# Patient Record
Sex: MALE | Race: WHITE
[De-identification: names, ages, dates, MRNs, and addresses within clinical notes are randomized per-mention and may not be internally consistent; named-entity substitution may affect disease eponyms.]

---

## 2017-10-26 ENCOUNTER — HOSPITAL ENCOUNTER (OUTPATIENT)
Dept: HOSPITAL 61 - PCVCIMAG | Age: 67
Discharge: HOME | End: 2017-10-26
Attending: INTERNAL MEDICINE
Payer: MEDICARE

## 2017-10-26 DIAGNOSIS — F17.200: ICD-10-CM

## 2017-10-26 DIAGNOSIS — R06.00: ICD-10-CM

## 2017-10-26 DIAGNOSIS — R07.89: ICD-10-CM

## 2017-10-26 DIAGNOSIS — R53.83: ICD-10-CM

## 2017-10-26 DIAGNOSIS — I10: Primary | ICD-10-CM

## 2017-10-26 PROCEDURE — 78452 HT MUSCLE IMAGE SPECT MULT: CPT

## 2017-10-26 PROCEDURE — A9500 TC99M SESTAMIBI: HCPCS

## 2017-10-26 PROCEDURE — 93306 TTE W/DOPPLER COMPLETE: CPT

## 2017-10-26 PROCEDURE — 93017 CV STRESS TEST TRACING ONLY: CPT

## 2017-10-26 NOTE — PCVCIMAG
--------------- APPROVED REPORT --------------





Study performed:  10/26/2017 08:57:02



EXAM: Comprehensive 2D, Doppler, and color-flow 

Echocardiogram

Patient Location: Echo lab

Room #:  2Status:  routine



BSA:         2.21

HR: 60 bpmBP:          142/80 mmHg

Rhythm: NSR



Other Information 

Study Quality: Adequate



Risk Factors: 

Cardiac Risk Factors:  HTN, Smoking



Indications

Chest Pressure

Dyspnea 

Fatigue 

Hypertension/HDD



2D Dimensions

LVEF(%):  69.60 (&gt;50%)

IVSd:  7.03 (7-11mm)LVOT Diam:  21.61 (18-24mm) 

LVDd:  47.64 mm

PWd:  11.30 (7-11mm)Ascending Ao:  41.96 (22-36mm)

LVDs:  28.96 (25-40mm)

Left Atrium:  30.29 (27-40mm)

Aortic Root:  35.53 mm

LV Single Plane 4CH:  56.79 %

LV Single Plane 2CH:  58.47 %Parry's LVEF:  57.63 %

Biplane EF:  56.6 %



Volumes

Left Atrial Volume (Systole)

Single Plane 4CH:  78.14 mLSingle Plane 2CH:  83.58 mL

Biplane LA Volume:  81.00 mLLA ESV Index:  37.00 mL/m2



Aortic Valve

AoV Peak Abhay.:  1.44 m/s

AO Peak Gr.:  8.29 mmHgLVOT Max PG:  3.21 mmHg

LVOT Max V:  0.89 m/s

RONAL Vmax: 2.26 cm2



Mitral Valve

E/A Ratio:  1.0

MV Decel. Time:  219.64 ms

MV E Max Abhay.:  0.68 m/s

MV A Abhay.:  0.71 m/s

IVRT:  152.25 ms



TDI

E/Lateral E':  11.33E/Medial E':  9.71

Medial E' Abhay.:  0.07 m/s

Lateral E' Abhay.:  0.06 m/s



Pulmonary Valve

PV Peak Abhay.:  0.91 m/sPV Peak Gr.:  3.33 mmHg



Pulmonary Vein

P Vein S:    0.53 m/sP Vein A:  0.35 m/s

P Vein D:   0.39 m/sP Vein A Dur.:  90.0 msec

P Vein S/D Ratio:  1.36



Tricuspid Valve

TR Peak Abhay.:  2.43 m/s

TR Peak Gr.:  23.65 mmHg

TV Vmax:  0.48 m/sPA Pressure:  31.00 mmHg



Left Ventricle

The left ventricle is normal size. There is normal LV segmental wall 

motion. There is normal left ventricular wall thickness. Left 

ventricular systolic function is normal. The left ventricular 

ejection fraction is within the normal range. LVEF is 55-60%. The 

left ventricular diastolic function is normal.



Right Ventricle

The right ventricle is normal size. The right ventricular systolic 

function is normal.



Atria

Left atrium is mildly dilated. The right atrium size is 

normal.



Aortic Valve

The aortic valve is normal in structure. No aortic regurgitation is 

present. There is no aortic valvular stenosis.



Mitral Valve

The mitral valve is normal in structure. There is no mitral valve 

regurgitation noted. No evidence of mitral valve stenosis.



Tricuspid Valve

The tricuspid valve is normal in structure. Trace to mild tricuspid 

regurgitation with a PA pressure of 31mmHg.



Pulmonic Valve

The pulmonary valve is normal in structure. There is no pulmonic 

valvular regurgitation.



Great Vessels

The aortic root is normal in size. The ascending aorta is mildly 

dilated in the proximal portion. The arch and descending aorta are 

not seen. IVC is not well visualized.



Pericardium

There is no pericardial effusion. There is no pleural 

effusion.



&lt;Conclusion&gt;

The left ventricle is normal size.

LVEF is 55-60%.

Left atrium is mildly dilated.

The aortic valve is normal in structure.

The mitral valve is normal in structure.

The tricuspid valve is normal in structure.

Trace to mild tricuspid regurgitation with a PA pressure of 

31mmHg.

The ascending aorta is mildly dilated in the proximal portion. The 

arch and descending aorta are not seen.

There is no pericardial effusion.

## 2017-10-26 NOTE — PCVCIMAG
--------------- APPROVED REPORT --------------





Exam: Nuclear Stress Test

Indication: Chest Tightness, Dyspnea on Exertion, Fatigue

Patient Location: Out Patient

Stress Nurse: Lisbet Fong RN, Haley Casper RN

NM Tech:Asif Lawrence NMTCB



Ht: 5 ft 11 in Wt: 223 lbs BSA:  2.21 m2

HR: 69 bpm                      BP: 143/84 mmHg         BMI:  

31.0

Rhythm:  SR



Medical History

Medical History: Age, Smoker,

Medications: Amlodipine-Benazepril

Allergies: ASA

Pretest Chest Pain Characteristics: No chest pain

Exercise History: Physically active



NM EXAM: Myocardial Perfusion REST/STRESS

Imaging Protocol: Rest Tc-99m/Stress Tc-99m 1 day



Resting Data

Rest SPECT myocardial perfusion imaging was performed in supine 

position 45 minutes following the intravenous injection of 11.3 mCi 

of Tc-99m Sestamibi.

Time of rest injection: 0940     



Pharmacologic Stress

Pharmacologic stress test was performed by injecting Regadenoson 0.4 

mg IV push followed by the intravenous injection of 31.5 mCi of 

Tc-99m Sestamibi.

Time of stress injection: 1130     Date: 10/26/2017

Gated Stress SPECT was performed 60 minutes after stress 

injection.

The images were gated to evaluate regional wall motion and calculate 

left ventricular ejection fraction. 



Study Data

Post stress, the left ventricular ejection was 68%..

SSS: 2

SRS: 10

SDS: 1

TID = 1.12.



Perfusion

There is a medium area of moderately reduced uptake in the entire 

segment of the inferior wall which is seen on the stress images and 

improves on the resting images.

This area thickens and moves normally and is most consistent with 

attenuation artifact but ischemia cannot be ruled out.



Wall Motion

Normal left ventricular wall motion.



Nuclear Conclusion

1. INTERMEDIATE RISK DUE TO SOME DEGREE OF REPERFUSION NOTED 



Interpreted by:  Remy Dalton MD

Electronically Approved: 10/26/2017 13:52:22



Stress Test Details

Stress Test:  Pharmacologic stress was paired with low level 

exercise.

  Reason for pharmacologic stress test: Hip/knee 

issues.

HR

Resting HR:            69 bpmMax Heart Rate (APMHR): 152 bpm 

Max HR Achieved:  100 bpmTarget HR (85% APMHR): 129 bpm

% of APMHR:         65

Recovery HR:            73 bpm



BP

Resting BP:  143/84 mmHg

Max BP:       136/78 mmHg



ECG

Resting ECG:  Sinus Rhythm

Stress ECG:     Sinus Tachycardia

Recovery ECG: Sinus Rhythm



Clinical

Reason for Termination: Completed protocol

Stress Symptoms: Dyspnea, Leg Fatigue

Exercise duration: 4 min 00 sec

Exercise capacity: 1.6 METs

Symptoms resolved with rest



Stress ECG Conclusion

1. ADEQUATE RESPONSE TO IV LEXISCAN

2. INADEQUATE HEART RATE RESPONSE FOR ECG 

DIAGNOSIS



&lt;Conclusion&gt;

1. ADEQUATE RESPONSE TO IV LEXISCAN

2. INADEQUATE HEART RATE RESPONSE FOR ECG DIAGNOSIS

## 2017-12-06 ENCOUNTER — HOSPITAL ENCOUNTER (OUTPATIENT)
Dept: HOSPITAL 35 - CATH | Age: 67
Discharge: HOME | End: 2017-12-06
Attending: INTERNAL MEDICINE
Payer: COMMERCIAL

## 2017-12-06 VITALS — DIASTOLIC BLOOD PRESSURE: 81 MMHG | SYSTOLIC BLOOD PRESSURE: 133 MMHG

## 2017-12-06 VITALS — BODY MASS INDEX: 30.48 KG/M2 | WEIGHT: 225 LBS | HEIGHT: 72 IN

## 2017-12-06 DIAGNOSIS — Z82.49: ICD-10-CM

## 2017-12-06 DIAGNOSIS — Z79.899: ICD-10-CM

## 2017-12-06 DIAGNOSIS — Z98.890: ICD-10-CM

## 2017-12-06 DIAGNOSIS — I10: ICD-10-CM

## 2017-12-06 DIAGNOSIS — I71.9: ICD-10-CM

## 2017-12-06 DIAGNOSIS — F17.200: ICD-10-CM

## 2017-12-06 DIAGNOSIS — Z96.642: ICD-10-CM

## 2017-12-06 DIAGNOSIS — I25.10: Primary | ICD-10-CM

## 2017-12-06 DIAGNOSIS — K21.9: ICD-10-CM

## 2017-12-06 DIAGNOSIS — Z88.6: ICD-10-CM

## 2017-12-06 LAB
ANION GAP SERPL CALC-SCNC: 8 MMOL/L (ref 7–16)
BUN SERPL-MCNC: 20 MG/DL (ref 7–18)
CALCIUM SERPL-MCNC: 9.1 MG/DL (ref 8.5–10.1)
CHLORIDE SERPL-SCNC: 104 MMOL/L (ref 98–107)
CO2 SERPL-SCNC: 24 MMOL/L (ref 21–32)
CREAT SERPL-MCNC: 0.9 MG/DL (ref 0.7–1.3)
ERYTHROCYTE [DISTWIDTH] IN BLOOD BY AUTOMATED COUNT: 14.6 % (ref 10.5–14.5)
GLUCOSE SERPL-MCNC: 96 MG/DL (ref 74–106)
HCT VFR BLD CALC: 36.5 % (ref 42–52)
HGB BLD-MCNC: 12.3 GM/DL (ref 14–18)
MCH RBC QN AUTO: 30.3 PG (ref 26–34)
MCHC RBC AUTO-ENTMCNC: 33.8 G/DL (ref 28–37)
MCV RBC: 89.7 FL (ref 80–100)
PLATELET # BLD: 227 THOU/UL (ref 150–400)
POTASSIUM SERPL-SCNC: 3.9 MMOL/L (ref 3.5–5.1)
RBC # BLD AUTO: 4.07 MIL/UL (ref 4.5–6)
SODIUM SERPL-SCNC: 136 MMOL/L (ref 136–145)
WBC # BLD AUTO: 6.6 THOU/UL (ref 4–11)

## 2018-01-16 ENCOUNTER — HOSPITAL ENCOUNTER (OUTPATIENT)
Dept: HOSPITAL 61 - PCVCCLINIC | Age: 68
Discharge: HOME | End: 2018-01-16
Attending: INTERNAL MEDICINE
Payer: MEDICARE

## 2018-01-16 DIAGNOSIS — F17.210: ICD-10-CM

## 2018-01-16 DIAGNOSIS — R06.09: ICD-10-CM

## 2018-01-16 DIAGNOSIS — Z79.899: ICD-10-CM

## 2018-01-16 DIAGNOSIS — I10: Primary | ICD-10-CM

## 2019-01-16 ENCOUNTER — HOSPITAL ENCOUNTER (OUTPATIENT)
Dept: HOSPITAL 35 - GI | Age: 69
Discharge: HOME | End: 2019-01-16
Attending: SPECIALIST
Payer: COMMERCIAL

## 2019-01-16 VITALS — HEIGHT: 74.02 IN | BODY MASS INDEX: 28.23 KG/M2 | WEIGHT: 220 LBS

## 2019-01-16 DIAGNOSIS — K64.8: ICD-10-CM

## 2019-01-16 DIAGNOSIS — K21.9: ICD-10-CM

## 2019-01-16 DIAGNOSIS — Z87.19: ICD-10-CM

## 2019-01-16 DIAGNOSIS — Z79.899: ICD-10-CM

## 2019-01-16 DIAGNOSIS — Z98.890: ICD-10-CM

## 2019-01-16 DIAGNOSIS — Z12.11: Primary | ICD-10-CM

## 2019-01-16 DIAGNOSIS — Z88.8: ICD-10-CM

## 2019-01-16 DIAGNOSIS — M19.90: ICD-10-CM

## 2019-01-16 DIAGNOSIS — F17.210: ICD-10-CM

## 2019-01-16 DIAGNOSIS — K57.30: ICD-10-CM

## 2019-01-16 DIAGNOSIS — D12.0: ICD-10-CM

## 2019-01-16 DIAGNOSIS — Z96.642: ICD-10-CM

## 2019-01-16 DIAGNOSIS — I10: ICD-10-CM

## 2019-01-16 PROCEDURE — 62110: CPT

## 2019-01-16 PROCEDURE — 62900: CPT

## 2019-01-16 NOTE — P
[facility] [place], [place]
[address] [place]   [address]                     PROCEDURE REPORT              
_______________________________________________________________________________
 
Name:       RYLAND LAND          [facility]                     [facility]      [id number]                       Account #:      [id number]  
Admission:  01/16/19    Attend Phys:    Kentrell Snyder
Discharge:              Date of Birth:  [birth year]  
                                                          Report #: [id number]
                                                                    [id number]   
_______________________________________________________________________________
THIS REPORT FOR:   //name//                          
 
CC: Kentrell Gomes DO
 
DATE OF SERVICE:  01/16/2019
 
 
PROCEDURE PERFORMED:  Colonoscopy with biopsies.
 
HISTORY OF PRESENT ILLNESS:  The patient is a 69-year-old male who is here for
routine screening colonoscopy.  Last colonoscopy was 10 years ago and normal. 
No family history of colon cancer.  He denies any symptoms.
 
DESCRIPTION OF PROCEDURE:  The risks and benefits of the procedure were
explained to the patient, those risks including but not limited to bleeding,
perforation and the risk of sedation.  He understood these risks and gave
informed consent.  Sedation was given using propofol per anesthesia.  Next, a
digital rectal exam was initially performed, which was normal.  Next, using a
standard Olympus colonoscope, the scope was placed in the patient's anus and
advanced under direct vision to the cecum.  The overall prep was excellent.  In
the cecum, there was a 5-mm sessile polyp.  This was removed with cold forceps,
otherwise normal.  The ileocecal valve was normal.  Ascending, transverse,
descending colon were normal.  A few scattered diverticula were noted in the
sigmoid colon, no evidence of inflammation, otherwise normal.  The rectal mucosa
was normal.  On retroflexion, small nonbleeding internal hemorrhoids were noted,
otherwise normal colonoscopy.  The scope was then withdrawn and the procedure
terminated.  The patient tolerated the procedure well.
 
IMPRESSION:
1.  Small colonic polyp.
2.  Mild sigmoid diverticulosis.
3.  Small internal hemorrhoids.
4.  Otherwise, normal colonoscopy.
 
RECOMMENDATIONS:
1.  Await biopsy results.
2.  If polyp is hyperplastic, repeat in 10 years; if adenomatous polyp, repeat
in 5 years.
 
 
 
[facility] [place]
[address] [place]   [address]                     PROCEDURE REPORT              
_______________________________________________________________________________
 
Name:       EMERYRYLAND AURE          [facility]                     [facility]      [id number]                       Account #:      [id number]  
Admission:  01/16/19    Attend Phys:    Kentrell Snyder
Discharge:              Date of Birth:  [birth year]  
                                                          Report #: [id number]
                                                                    [id number]   
_______________________________________________________________________________
 
Thank you for allowing me to participate in his care.
 
 
 
 
 
 
 
 
 
 
 
 
 
 
 
 
 
 
 
 
 
 
 
 
 
 
 
 
 
 
 
 
 
 
 
 
 
 
 
 
 
 
  <ELECTRONICALLY SIGNED>
   By: Kentrell Higgins MD   
  01/16/19     1135
D: 01/16/19 1013                           _____________________________________
T: 01/16/19 1121                           Kentrell Higgins MD     /nt

## 2019-01-17 NOTE — PATH
[facility]
[address] [place]   [address]                     PATHOLOGY RPT PROCEDURE       
_______________________________________________________________________________
 
Name:       ZOLTAN LAND          Room #:                     REG [facility] 
M.R.#:      [id number]     Account #:      [id number]  
Admission:  01/16/19    Date of Birth:  [birth year]  
Discharge:                                              Report #:    [id number]
                                                        Path Case #: [id number]
_______________________________________________________________________________
 
[facility] Accession Number: [id number]
.                                                                01
Material submitted:                                        .
CECAL POLYP
.                                                                01
Clinical history:                                          .
Screening
Colon polyp, diverticulosis
.                                                                02
**********************************************************************
Diagnosis:
Polyp, cecal polyp, endoscopic biopsy:
- Tubular adenoma.
- Negative for high grade dysplasia.
.
(IUV:mml; 01/17/2019)
QLM/01/17/2019
**********************************************************************
.                                                                02
Electronically signed:                                     .
Marija Mcknight MD, Pathologist
NPI- [id number]
.                                                                01
Gross description:                                         .
The specimen is received in formalin, labeled "Zoltan Land, polyp at
cecum" and consists of 2 fragments of soft tan-brown tissue measuring 0.4
x 0.3 x 0.1 cm and 0.4 x 0.3 x 0.2 cm.  They are entirely submitted in A1.
(SDY; 1/16/2019)
SYU/SYU
.                                                                02
Pathologist provided ICD-10:
D12.0
.                                                                02
CPT                                                        .
583273
Specimen Comment: A courtesy copy of this report has been sent to
Specimen Comment: [phone], [phone].
Specimen Comment: Report sent to  and 
***Performed at:  01
   [address], [place], [place]  [id number]
   MD Ben Andujar MD Phone:  [phone]
***Performed at:  02
   [address]  [id number]
   MD Marija Mcknight MD Phone:  [phone]